# Patient Record
Sex: FEMALE | Race: WHITE
[De-identification: names, ages, dates, MRNs, and addresses within clinical notes are randomized per-mention and may not be internally consistent; named-entity substitution may affect disease eponyms.]

---

## 2018-05-01 ENCOUNTER — HOSPITAL ENCOUNTER (OUTPATIENT)
Dept: HOSPITAL 39 - MAMMO | Age: 58
End: 2018-05-01
Attending: NURSE PRACTITIONER
Payer: COMMERCIAL

## 2018-05-01 DIAGNOSIS — Z12.31: Primary | ICD-10-CM

## 2018-05-03 NOTE — MAM
EXAM DESCRIPTION: 

3D Screening BILATERAL : Digital Mammography.



CLINICAL HISTORY: 

58 years Female SCREENING . No complaints. Remote family history

of breast cancer. Postmenopausal. Currently on HRT. Prior cyst

aspiration right breast.



COMPARISON: 

Digital diagnostic right breast mammography 8/17/2010. Bilateral

screening digital mammography 6/17/2010.  No prior reports

available.    



TECHNIQUE: 

Bilateral CC and MLO projection full-field images,  3-D

tomosynthesis digital mammographic technique. Also bilateral 

synthesized CC/ MLO full-field images.   CAD not utilized.   



FINDINGS: 

The breast parenchymal density pattern is:  Extremely dense

breast tissue, which lowers the sensitivity of mammography. No

skin thickening or nipple retraction  bilateral solitary

microcalcifications. Bilateral axillary lymph nodes.

No focal, stellate mass or density, focal asymmetry , and no

suspicious microcalcifications bilaterally. Stable mammograms

compared to prior study, taking into account differences in

mammographic technique



IMPRESSION: 

BI-RADS CATEGORY: 2 - BENIGN FINDINGS.

FOLLOW UP: Routine digital bilateral  screening, one year

interval from  May 2018.



Written communication explaining the IMPRESSION and follow-up,

will be mailed to the patient and referring health care provider.



According to the [employer], yearly mammograms

are recommended starting at age 40 and continuing as long as a

woman is in good health.  Any breast change noted on a breast

self-exam should be reported promptly to the patient's healthcare

provider.  Breast MRI is recommended for women with an

approximately 20-25% or greater lifetime risk of breast cancer,

including women with a strong family history of breast or ovarian

cancer and women who have been treated for Hodgkin's disease.



A negative mammographic report should not delay tissue diagnosis

in patients with significant clinical history or physical

findings.



Extremely dense breast tissue limits the sensitivity of digital

mammography. 



Electronically signed by:  Keo Cueva MD  5/3/2018 8:17 AM CDT

Workstation: [id number]